# Patient Record
Sex: FEMALE | Race: WHITE | NOT HISPANIC OR LATINO | URBAN - METROPOLITAN AREA
[De-identification: names, ages, dates, MRNs, and addresses within clinical notes are randomized per-mention and may not be internally consistent; named-entity substitution may affect disease eponyms.]

---

## 2021-09-10 ENCOUNTER — WEB ENCOUNTER (OUTPATIENT)
Dept: URBAN - METROPOLITAN AREA CLINIC 98 | Facility: CLINIC | Age: 61
End: 2021-09-10

## 2021-11-23 ENCOUNTER — OFFICE VISIT (OUTPATIENT)
Dept: URBAN - METROPOLITAN AREA CLINIC 12 | Facility: CLINIC | Age: 61
End: 2021-11-23
Payer: COMMERCIAL

## 2021-11-23 ENCOUNTER — DASHBOARD ENCOUNTERS (OUTPATIENT)
Age: 61
End: 2021-11-23

## 2021-11-23 DIAGNOSIS — Z12.11 COLON CANCER SCREENING: ICD-10-CM

## 2021-11-23 DIAGNOSIS — K58.1 IRRITABLE BOWEL SYNDROME WITH CONSTIPATION: ICD-10-CM

## 2021-11-23 DIAGNOSIS — K59.09 CHRONIC CONSTIPATION: ICD-10-CM

## 2021-11-23 DIAGNOSIS — K21.9 LARYNGOPHARYNGEAL REFLUX (LPR): ICD-10-CM

## 2021-11-23 DIAGNOSIS — K44.9 HIATAL HERNIA: ICD-10-CM

## 2021-11-23 DIAGNOSIS — R12 HEARTBURN: ICD-10-CM

## 2021-11-23 PROBLEM — 440630006: Status: ACTIVE | Noted: 2021-11-23

## 2021-11-23 PROBLEM — 16331000: Status: ACTIVE | Noted: 2021-11-23

## 2021-11-23 PROBLEM — 414581006: Status: ACTIVE | Noted: 2021-11-23

## 2021-11-23 PROBLEM — 305058001: Status: ACTIVE | Noted: 2021-11-23

## 2021-11-23 PROBLEM — 236069009: Status: ACTIVE | Noted: 2021-11-23

## 2021-11-23 PROCEDURE — 99204 OFFICE O/P NEW MOD 45 MIN: CPT | Performed by: INTERNAL MEDICINE

## 2021-11-23 PROCEDURE — 99244 OFF/OP CNSLTJ NEW/EST MOD 40: CPT | Performed by: INTERNAL MEDICINE

## 2021-11-23 RX ORDER — SOY PROTEIN
POWDER (GRAM) ORAL
Qty: 0 | Refills: 0 | Status: DISCONTINUED | COMMUNITY
Start: 1900-01-01

## 2021-11-23 RX ORDER — ASPIRIN/ACETAMINOPHEN/CAFFEINE 250-250-65
TABLET ORAL
Qty: 0 | Refills: 0 | Status: ACTIVE | COMMUNITY
Start: 1900-01-01

## 2021-11-23 RX ORDER — ESOMEPRAZOLE MAGNESIUM 20 MG/1
1 CAPSULE CAPSULE, DELAYED RELEASE ORAL ONCE A DAY
Status: ACTIVE | COMMUNITY

## 2021-11-23 RX ORDER — ESOMEPRAZOLE MAGNESIUM 40 MG/1
1 CAPSULE CAPSULE, DELAYED RELEASE ORAL ONCE A DAY
Qty: 90 | Refills: 3 | OUTPATIENT
Start: 2021-11-23

## 2021-11-23 RX ORDER — LINACLOTIDE 290 UG/1
1 CAPSULE AT LEAST 30 MINUTES BEFORE THE FIRST MEAL OF THE DAY ON AN EMPTY STOMACH CAPSULE, GELATIN COATED ORAL ONCE A DAY
Qty: 90 | Refills: 3 | OUTPATIENT
Start: 2021-11-23 | End: 2022-11-18

## 2021-11-23 RX ORDER — LINACLOTIDE 290 UG/1
1 CAPSULE AT LEAST 30 MINUTES BEFORE THE FIRST MEAL OF THE DAY ON AN EMPTY STOMACH CAPSULE, GELATIN COATED ORAL ONCE A DAY
Status: ACTIVE | COMMUNITY

## 2021-11-23 RX ORDER — SODIUM SULFATE, MAGNESIUM SULFATE, AND POTASSIUM CHLORIDE 17.75; 2.7; 2.25 G/1; G/1; G/1
12 TABLETS TABLET ORAL
Qty: 24 TABLETS | Refills: 0 | OUTPATIENT
Start: 2021-11-23 | End: 2021-11-24

## 2021-11-23 NOTE — HPI-TODAY'S VISIT:
60 yo  woman presents for another GI opinion on referral from Dr. Tapia for constipation. A copy of this note will be going to Dr. Dangelo.  She had seen Dr. Markie Presley in our group.  I have reviewed those records. She had EGD and EUS on 11/23/16 which showed medium sized hiatal hernia and mild gastritis without H pylori on biopsies with normal duodenal biopsies. Her pancreas was normal and bile duct at 8mm and post CCY.  She had f/u with Dr. Presley on 12/20/16 in the office.  Patient was advised that she should avoid biliary sphincterotomy by Dr. Mejia Cortes.   I had seen her in 2011 and she had a negative colonoscopy in 2011.  She had  been having 1 BM per week and bloating and constipation problems for many years. Recently this has improved with taking Linzess 290 mcg Qday by her neurologist Dr. Larry Ndiaye over the past couple of weeks. Prior to that she was taking laxatives such as Dulolax, Senokot, Aloe. Patient has MS and was dx'd in 2009.   She was dx'd with LPR by her ENT in the past and had been on Nexium 3x per day and had laryngospasm and hoarseness.  She is currently taking Nexium 20mg per day as prescribed by her PCP as patient has osteoporosis. Patient states that her heartburn has been a little worse recently since decrasing to Nexium 20mg per day and wanted to increase to Nexium 40mg per day. She has heartburn especially with belching symptoms at night. No dysphagia or odynophagia. No early satiety. No weight loss. No vomiting.

## 2024-11-21 ENCOUNTER — OFFICE VISIT (OUTPATIENT)
Dept: URBAN - METROPOLITAN AREA CLINIC 12 | Facility: CLINIC | Age: 64
End: 2024-11-21
Payer: COMMERCIAL

## 2024-11-21 ENCOUNTER — LAB OUTSIDE AN ENCOUNTER (OUTPATIENT)
Dept: URBAN - METROPOLITAN AREA CLINIC 12 | Facility: CLINIC | Age: 64
End: 2024-11-21

## 2024-11-21 VITALS
HEIGHT: 68 IN | HEART RATE: 94 BPM | DIASTOLIC BLOOD PRESSURE: 76 MMHG | SYSTOLIC BLOOD PRESSURE: 135 MMHG | TEMPERATURE: 98 F | BODY MASS INDEX: 22.52 KG/M2 | WEIGHT: 148.6 LBS

## 2024-11-21 DIAGNOSIS — K59.09 CHRONIC CONSTIPATION: ICD-10-CM

## 2024-11-21 DIAGNOSIS — R63.4 WEIGHT LOSS: ICD-10-CM

## 2024-11-21 DIAGNOSIS — R10.31 RLQ ABDOMINAL PAIN: ICD-10-CM

## 2024-11-21 DIAGNOSIS — R11.0 NAUSEA: ICD-10-CM

## 2024-11-21 DIAGNOSIS — R63.0 DECREASED APPETITE: ICD-10-CM

## 2024-11-21 DIAGNOSIS — R10.13 EPIGASTRIC PAIN: ICD-10-CM

## 2024-11-21 PROBLEM — 301754002: Status: ACTIVE | Noted: 2024-11-21

## 2024-11-21 PROBLEM — 64379006: Status: ACTIVE | Noted: 2024-11-21

## 2024-11-21 PROBLEM — 422587007: Status: ACTIVE | Noted: 2024-11-21

## 2024-11-21 PROBLEM — 89362005: Status: ACTIVE | Noted: 2024-11-21

## 2024-11-21 PROCEDURE — 99214 OFFICE O/P EST MOD 30 MIN: CPT

## 2024-11-21 RX ORDER — LINACLOTIDE 290 UG/1
1 CAPSULE AT LEAST 30 MINUTES BEFORE THE FIRST MEAL OF THE DAY ON AN EMPTY STOMACH CAPSULE, GELATIN COATED ORAL ONCE A DAY
Status: ACTIVE | COMMUNITY

## 2024-11-21 RX ORDER — ESOMEPRAZOLE MAGNESIUM 40 MG/1
1 CAPSULE CAPSULE, DELAYED RELEASE ORAL ONCE A DAY
Qty: 90 | Refills: 3 | Status: ACTIVE | COMMUNITY
Start: 2021-11-23

## 2024-11-21 NOTE — HPI-TODAY'S VISIT:
Pt is a 65yo female who presents with persistent nausea that began in September. Her last EGD and colonoscopy were in 2011 with Dr. Domingo. The colonoscopy was unremarkable, with a follow-up recommended in 7-10 years. The EGD revealed gastritis, but the biopsy was negative for H. pylori or intestinal metaplasia, and a hiatal hernia was noted.   She reports the nausea started after a dental implant and a 20-day course of antibiotics. She experiences epigastric pain and right-sided abdominal discomfort, along with a decreased appetite. She has lost approximately 11 pounds since September.  She was evaluated by her primary care provider a month ago, s/p an abdominal ultrasound which was unremarkable, except for hepatic steatosis. Lab results from August showed mild anemia, which improved to borderline levels by October. She has been taking Nexium 40mg daily for over a month, but reports no significant improvement in symptoms. Takes Zofran daily with some symptom relief. She denies heartburn, acid reflux, vomiting, or melena, and she does not frequently use NSAIDs.   She has a long history of constipation, with bowel movements occurring approximately three times a week. She takes Linzess 290mcg as needed, but denies any low abdominal cramping or rectal bleeding.  There is no family history of gastrointestinal cancer, although her maternal grandmother had pancreatic cancer. Denies problems with heart, lungs or kidneys. No trouble with anesthesia in the past. Plans to have her blood work done again next week.
PRE-OP DIAGNOSIS:  Thyroid goiter 03-Sep-2019 13:26:04  Enrique Hightower

## 2024-11-27 ENCOUNTER — TELEPHONE ENCOUNTER (OUTPATIENT)
Dept: URBAN - METROPOLITAN AREA CLINIC 12 | Facility: CLINIC | Age: 64
End: 2024-11-27

## 2024-12-02 ENCOUNTER — LAB OUTSIDE AN ENCOUNTER (OUTPATIENT)
Dept: URBAN - METROPOLITAN AREA CLINIC 12 | Facility: CLINIC | Age: 64
End: 2024-12-02

## 2024-12-04 ENCOUNTER — LAB OUTSIDE AN ENCOUNTER (OUTPATIENT)
Dept: URBAN - METROPOLITAN AREA CLINIC 12 | Facility: CLINIC | Age: 64
End: 2024-12-04

## 2024-12-08 LAB
CREATININE POC: 0.9
PERFORMING LAB: (no result)

## 2024-12-23 ENCOUNTER — TELEPHONE ENCOUNTER (OUTPATIENT)
Dept: URBAN - METROPOLITAN AREA CLINIC 12 | Facility: CLINIC | Age: 64
End: 2024-12-23

## 2024-12-24 ENCOUNTER — CLAIMS CREATED FROM THE CLAIM WINDOW (OUTPATIENT)
Dept: URBAN - METROPOLITAN AREA SURGERY CENTER 18 | Facility: SURGERY CENTER | Age: 64
End: 2024-12-24

## 2024-12-24 RX ORDER — ESOMEPRAZOLE MAGNESIUM 40 MG/1
1 CAPSULE CAPSULE, DELAYED RELEASE ORAL ONCE A DAY
Qty: 90 | Refills: 3 | Status: ACTIVE | COMMUNITY
Start: 2021-11-23

## 2024-12-24 RX ORDER — LINACLOTIDE 290 UG/1
1 CAPSULE AT LEAST 30 MINUTES BEFORE THE FIRST MEAL OF THE DAY ON AN EMPTY STOMACH CAPSULE, GELATIN COATED ORAL ONCE A DAY
Status: ACTIVE | COMMUNITY

## 2025-01-03 ENCOUNTER — OFFICE VISIT (OUTPATIENT)
Dept: URBAN - METROPOLITAN AREA CLINIC 12 | Facility: CLINIC | Age: 65
End: 2025-01-03

## 2025-01-07 ENCOUNTER — OFFICE VISIT (OUTPATIENT)
Dept: URBAN - METROPOLITAN AREA CLINIC 12 | Facility: CLINIC | Age: 65
End: 2025-01-07
Payer: COMMERCIAL

## 2025-01-07 VITALS
BODY MASS INDEX: 22.28 KG/M2 | HEART RATE: 87 BPM | DIASTOLIC BLOOD PRESSURE: 78 MMHG | HEIGHT: 68 IN | WEIGHT: 147 LBS | SYSTOLIC BLOOD PRESSURE: 137 MMHG | TEMPERATURE: 97.5 F

## 2025-01-07 DIAGNOSIS — R63.4 WEIGHT LOSS: ICD-10-CM

## 2025-01-07 DIAGNOSIS — R63.0 DECREASED APPETITE: ICD-10-CM

## 2025-01-07 DIAGNOSIS — R11.0 NAUSEA: ICD-10-CM

## 2025-01-07 DIAGNOSIS — K59.09 CHRONIC CONSTIPATION: ICD-10-CM

## 2025-01-07 PROCEDURE — 99214 OFFICE O/P EST MOD 30 MIN: CPT

## 2025-01-07 RX ORDER — ONDANSETRON 4 MG/1
1 TABLET ON THE TONGUE AND ALLOW TO DISSOLVE TABLET, ORALLY DISINTEGRATING ORAL TWICE A DAY
Qty: 180 TABLET | Refills: 1 | OUTPATIENT
Start: 2025-01-07

## 2025-01-07 RX ORDER — LINACLOTIDE 290 UG/1
1 CAPSULE AT LEAST 30 MINUTES BEFORE THE FIRST MEAL OF THE DAY ON AN EMPTY STOMACH CAPSULE, GELATIN COATED ORAL ONCE A DAY
Status: ACTIVE | COMMUNITY

## 2025-01-07 RX ORDER — ESOMEPRAZOLE MAGNESIUM 40 MG/1
1 CAPSULE CAPSULE, DELAYED RELEASE ORAL ONCE A DAY
Qty: 90 | Refills: 3 | Status: ACTIVE | COMMUNITY
Start: 2021-11-23

## 2025-01-07 NOTE — HPI-TODAY'S VISIT:
Pt is a 65 yo female presents today for an egd/colon f/u.  She was seen by me on 11/21/24 for weight loss, nausea, RLQ pain, epigastric pain and constipation. Abd CT revealed no acute finding but there was a renal lesion. Pt was recommended to f/u with her PCP regarding this finding. She states she has an MRI scheduled for further eval.    EGD/Colon done on 12/24/24 by Dr. Domingo: EGD: esophagitis, medium-sized HH, gastritis, multiple fundic gland polyps. Bx neg for HP, IM or celiac. Colon: 10mm TA in cecum, diverticulosis, external hemorrhoids. Repeat in 3 years advised.   Today she reports mild improvement in her nausea. She continues to take Zofran once or twice daily, which provides relief. The nausea is particularly worse in the mornings between 9 AM and 12 PM. The patient notes that her symptoms were improving prior to a recent procedures but have since returned. She is taking Nexium 40mg daily. She denies any significant weight loss, with her weight remaining stable at approximately 148 lbs.  She also reports chronic constipation, having BM 1-2 times a week which she describes as her baseline. She uses Linzess 290mcg as needed, typically once or twice a week.   She mentions significant stress due to her daughter's ongoing divorce, which she believes may be contributing to her gastrointestinal symptoms.  Last OV note from 11/21/24 Pt is a 63yo female who presents with persistent nausea that began in September. Her last EGD and colonoscopy were in 2011 with Dr. Domingo. The colonoscopy was unremarkable, with a follow-up recommended in 7-10 years. The EGD revealed gastritis, but the biopsy was negative for H. pylori or intestinal metaplasia, and a hiatal hernia was noted.   She reports the nausea started after a dental implant and a 20-day course of antibiotics. She experiences epigastric pain and right-sided abdominal discomfort, along with a decreased appetite. She has lost approximately 11 pounds since September.  She was evaluated by her primary care provider a month ago, s/p an abdominal ultrasound which was unremarkable, except for hepatic steatosis. Lab results from August showed mild anemia, which improved to borderline levels by October. She has been taking Nexium 40mg daily for over a month, but reports no significant improvement in symptoms. Takes Zofran daily with some symptom relief. She denies heartburn, acid reflux, vomiting, or melena, and she does not frequently use NSAIDs.   She has a long history of constipation, with bowel movements occurring approximately three times a week. She takes Linzess 290mcg as needed, but denies any low abdominal cramping or rectal bleeding.  There is no family history of gastrointestinal cancer, although her maternal grandmother had pancreatic cancer. Denies problems with heart, lungs or kidneys. No trouble with anesthesia in the past. Plans to have her blood work done again next week.

## 2025-05-09 ENCOUNTER — TELEPHONE ENCOUNTER (OUTPATIENT)
Dept: URBAN - METROPOLITAN AREA CLINIC 111 | Facility: CLINIC | Age: 65
End: 2025-05-09

## 2025-06-09 ENCOUNTER — TELEPHONE ENCOUNTER (OUTPATIENT)
Dept: URBAN - METROPOLITAN AREA CLINIC 97 | Facility: CLINIC | Age: 65
End: 2025-06-09